# Patient Record
Sex: MALE | URBAN - METROPOLITAN AREA
[De-identification: names, ages, dates, MRNs, and addresses within clinical notes are randomized per-mention and may not be internally consistent; named-entity substitution may affect disease eponyms.]

---

## 2018-09-11 ENCOUNTER — OFFICE VISIT (OUTPATIENT)
Dept: URBAN - METROPOLITAN AREA CLINIC 11 | Facility: CLINIC | Age: 68
End: 2018-09-11
Payer: MEDICARE

## 2018-09-11 PROCEDURE — 92004 COMPRE OPH EXAM NEW PT 1/>: CPT | Performed by: OPTOMETRIST

## 2018-09-11 ASSESSMENT — KERATOMETRY
OS: 43.38
OD: 43.13

## 2018-09-11 ASSESSMENT — INTRAOCULAR PRESSURE
OS: 19
OD: 20

## 2018-09-11 NOTE — IMPRESSION/PLAN
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 - pupils normal, no GCA symptoms at all Plan: Most common etiologies - Vasculopathic (diabetes, hypertension, other atherosclerotic risk factors), trauma, idiopathic See primary DrTeddy again for work-up. OK to use a patch to avoid diplopia.

## 2018-10-23 ENCOUNTER — OFFICE VISIT (OUTPATIENT)
Dept: URBAN - METROPOLITAN AREA CLINIC 11 | Facility: CLINIC | Age: 68
End: 2018-10-23
Payer: MEDICARE

## 2018-10-23 DIAGNOSIS — H49.22 6TH NERVE PALSY OF LEFT EYE: Primary | ICD-10-CM

## 2018-10-23 PROCEDURE — 99214 OFFICE O/P EST MOD 30 MIN: CPT | Performed by: OPTOMETRIST

## 2018-10-23 ASSESSMENT — INTRAOCULAR PRESSURE
OD: 19
OS: 18

## 2023-09-21 NOTE — IMPRESSION/PLAN
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 - pupils normal, no GCA symptoms at all - resolved Plan:
As above.